# Patient Record
Sex: FEMALE | Race: OTHER
[De-identification: names, ages, dates, MRNs, and addresses within clinical notes are randomized per-mention and may not be internally consistent; named-entity substitution may affect disease eponyms.]

---

## 2019-08-23 ENCOUNTER — HOSPITAL ENCOUNTER (INPATIENT)
Dept: HOSPITAL 74 - JLDR | Age: 39
LOS: 3 days | Discharge: HOME | End: 2019-08-26
Attending: OBSTETRICS & GYNECOLOGY | Admitting: OBSTETRICS & GYNECOLOGY
Payer: COMMERCIAL

## 2019-08-23 VITALS — BODY MASS INDEX: 26.3 KG/M2

## 2019-08-23 DIAGNOSIS — O34.03: ICD-10-CM

## 2019-08-23 DIAGNOSIS — Q51.3: ICD-10-CM

## 2019-08-23 DIAGNOSIS — Z3A.39: ICD-10-CM

## 2019-08-23 DIAGNOSIS — O32.2XX0: Primary | ICD-10-CM

## 2019-08-23 RX ADMIN — Medication SCH MLS/HR: at 13:44

## 2019-08-23 RX ADMIN — IBUPROFEN PRN MG: 800 INJECTION INTRAVENOUS at 21:45

## 2019-08-23 NOTE — HP
Past Medical History





- Admission


Chief Complaint: transverse lie for c s


History Source: Patient


Limitations to Obtaining History: No Limitations





- Past Medical History


CNS: No: Alzheimer's, CVA, Dementia, Migraine, Multiple Sclerosis, Peripheral 

Neuropathy, Parkinson's, Seizure, Syncope, TIA, Vertigo, Other


Cardiovascular: No: AFIB, Aneurysm, Aortic Insufficiency, Aortic Stenosis, CAD, 

CHF, Deep Vein Thrombosis, HTN, Hyperlipdemia, MI, Mitral Insufficiency, Mitral 

Stenosis, Murmur, Pulmonary Hypertension, Other


Pulmonary: No: Asthma, Bronchitis, Cancer, COPD, O2 Dependent, Pneumonia, 

Previously Intubated, Pulmonary Embolus, Pulmonary Fibrosis, Sleep Apnea, Other


Gastrointestinal: No: Ascites, Cancer, Constipation, Crohn's Disease, 

Diverticulitis, Diverticulosis, Esophageal Varices, Gastritis, GERD, GI Bleed, 

Hemorrhoids, Hiatal Hernia, Inflamatory Bowel Disease, Irritable Bowel Disease, 

Pancreatitis, Peptic Ulcer Disease, Ulcerative Colitis, Other


Hepatobiliary: No: Cirrhosis, Cholelithiasis, Cholecystitis, Choledocholithiasis

, Hepatitis A, Hepatitis B, Hepatitis C, Other


Renal/: No: Renal Failure, Renal Inusuff, BPH, Cancer, Hematuria, Hemodialysis

, Neurogenic Bladder, Renal Calculi, UTI, Other


Reproductive: No: Ectopic Pregnancy, Endometriosis, Fibroids, PID, Polycystic 

Ovary Syndrome, Postmenopausal, Other


...: 1


...Para: 0


...Term: 0


...: 0


...Spon : 0


...Induced : 0


...Multiple Gestation: 0


...LMP: 18


... Weeks Gestation by Dates: 39.0


...EDC by Dates: 19


...EDC by Sono: 19


Heme/Onc: No: Anemia, B12 Deficiency, Bleeding Disorder, Cancer, Current 

Chemotherapy, Current Radiation Therapy, Hemochromatosis, Hypercoaguable State, 

Myeloproliferative Synd, Sickle Cell Disease, Sickle Cell Trait, 

Thrombocytopenia, Other


Infectious Disease: No: AIDS, C-Diff, Herpes Zoster, HIV, MRSA, STD's, 

Tuberculosis, VREF, Other


Psych: No: Addictions, Anxiety, Bipolar, Depression, Panic, Psychosis, 

Schizophrenia, Other


Musculoskeletal: No: Bursitis, Chronic low back pain, Hemiparesis, Hemiplegia, 

Osteoarthritis, Paraplegia, Other


Rheumatology: No: Fibromyalgia, Gout, Lupus, Rheumatoid Arthritis, Sarcoidosis, 

Vasculitis, Other


ENT: No: Allergic Rhinitis, Sinusitis, Other


Endocrine: No: [place]'s Disease, Cushing's Disease, Diabetes Insipidus, 

Diabetes Mellitus, Hyperparathyroidism, Hyperthyroidism, Hypothyroidism, 

Osteopenia, SIADH, Other


Dermatology: No: Basal Cell, Cellulitis, Eczema, Melanoma, Psoriasis, Squamous 

Cell, Other





- Past Surgical History


Past Surgical History: No: None, AAA Repair, AICD, Amputation, Appendectomy, 

Arthrosocopy, AV Fistula/Graft, Bariatric Surgery, Breast Biopsy, Bypass, CABG, 

Carotid Endarterectomy, Cataract Removal, Cholecystectomy, Colectomy, 

Colonoscopy, Colostomy, Craniotomy, , Cystectomy, Hernia Repair, 

Hysterectomy, Ileal Conduit, Ileosotomy, Joint Replacement, Kidney Transplant, 

Laminectomy, Liver Transplant, Mastectomy, Nephrectomy, Oopherectomy, 

Orchiectomy, Permanent Pacemaker, Prostatectomy, Splenectomy, Stent, Thoracotomy

, TURP, Tonsillectomy, Tubal Ligation, Upper Endoscopy, Valve Replacement, 

Vasectomy, Vein Stripping/Ligation


Hx Myomectomy: No


Hx Transabdominal Cerclage: No





- Advance Directives


Advance Directives: Yes: Living Will





- Smoking History


Smoking history: Never smoked


Have you smoked in the past 12 months: No





- Alcohol/Substance Use


Hx Alcohol Use: No


History of Substance Use: reports: None





- Social History


Usual Living Arrangement: Yes: With Spouse


ADL: Independent


History of Recent Travel: No





Home Medications





- Allergies


Allergies/Adverse Reactions: 


 Allergies











Allergy/AdvReac Type Severity Reaction Status Date / Time


 


No Known Allergies Allergy   Verified 19 11:38














- Home Medications


Home Medications: 


Ambulatory Orders





Prenatal Vitamins (Sjr) - 1 tab PO DAILY 19 











Family Disease History





- Family Disease History


Family History: Denies





Review of Systems





- Review of Systems


Constitutional: reports: No Symptoms


Eyes: reports: No Symptoms


HENT: reports: No Symptoms


Neck: reports: No Symptoms


Cardiovascular: reports: No Symptoms


Respiratory: reports: No Symptoms


Gastrointestinal: reports: No Symptoms


Genitourinary: reports: No Symptoms


Breasts: reports: No Symptoms Reported


Musculoskeletal: reports: No Symptoms


Integumentary: reports: No Symptoms


Neurological: reports: No Symptoms


Endocrine: reports: No Symptoms


Hematology/Lymphatic: reports: No Symptoms


Psychiatric: reports: No Symptoms


Pain Intensity: 0





Physical Exam - Maternity


Vital Signs: 


 Vital Signs











Temperature  97.5 F L  19 15:50


 


Pulse Rate  69   19 15:50


 


Respiratory Rate  15   19 15:50


 


Blood Pressure  94/61   19 15:50


 


O2 Sat by Pulse Oximetry (%)  100   19 15:50











Constitutional: Yes: Well Nourished, No Distress, Calm


Eyes: Yes: WNL, Conjunctiva Clear, EOM Intact


HENT: Yes: WNL, Atraumatic, Normocephalic


Neck: Yes: WNL, Supple, Trachea Midline


Cardiovascular: Yes: WNL, Regular Rate and Rhythm


Lungs: Clear to auscultation


Breast(s): Yes: WNL





- Abdominal Exam/OB


Fundal Height: 38


Number of Fetuses: Single


Fetal Presentation: Transverse


Contractions: Yes


Regularity: Irritability


Intensity: Unaware


Fetal Monitor Mode: External


Fetal Heart Rate Location: [facility] [place]


Category: I


Accelerations: Uniform


Decelerations: None





- Vaginal Exam/OB


Vaginal Bleediing: No


Speculum Exam: No


Dilatation (cm): 1


Effacement (%): 20 


Amniotic Membrane Status: Intact


Fetal Presentation: Transverse/Shoulder


Fetal Station: -3





- Physical Exam


Musculoskeletal: Yes: WNL


Extremities: Yes: WNL


Edema: No


Edema: LUE: 1+, RUE: 1+, LLE: 1+, RLE: 1+


Integumentary: Yes: WNL


Deep Tendon Reflex Grade: Normal +2


...Motor Strength: WNL


Psychiatric: Yes: WNL, Alert, Oriented





Hemorrhage Risk Assessment





- Risk Factors


Risk Score: 0


Risk Level: Low Risk





Assessment/Plan





transverse lie for c s

## 2019-08-23 NOTE — OP
DATE OF OPERATION:  2019

 

PREOPERATIVE DIAGNOSIS:  A transverse lie.  

 

POSTOPERATIVE DIAGNOSIS:  A transverse lie.  Bicornuate uterus.  

 

PROCEDURE:  Primary low transverse  section.  

 

SURGEON:  Hardik Dasilva M.D. 

 

ASSISTANT:  CYNDI Craig

 

ANESTHESIA:  Spinal

 

ANESTHESIOLOGIST:  Benny Harris M.D. 

 

INDICATION:  This is a 38-year-old female patient, 39 weeks pregnant.  Baby has been

stabilized from breech to vertex to transverse to breech to transverse again to

today, a transverse lie, 39 weeks pregnant.  Patient has been with sonogram in the

past 6-7 weeks, and the patient is explained the situation, and the patient agreed

for primary low transverse  section.  _____ request  too.

 

DESCRIPTION OF PROCEDURE:  Patient was placed to OR and placed on operating table in

supine position after spinal anesthesia was obtained.  The patient's abdomen and

pelvis were prepped and draped in the usual sterile manner.  Pfannenstiel incision

was made.  The incision was made through skin, subcutaneous tissue, until the fascia

was nicked in the midline.  The fascia was extended bilaterally.  Intraperitoneal

cavity was entered, bladder flap was created.  Low transverse section of uterus was

entered, baby delivered from transverse presentation to vertex presentation.  Patient

was removed in the vertex presentation.  Baby was handed over to the pediatrician

after umbilical cord was doubly clamped and cut.  Cord blood gas was obtained. 

Placenta was removed.  Uterus was closed in single layer, first layer interlocking

Vicryl suture, good hemostasis, and both gutters clean.  Both ovaries, fallopian

tubes were within normal limits.  However, uterus appeared to be bicornuate uterus. 

No septum in the uterus.  Appeared to be thickened in the middle.  So this could be

the cause of the transverse lie of the baby, so uterus was closed in single layer. 

Good hemostasis.  The both gutters were clean, and draining clear urine _____ 100 mL.

 Peritoneum was closed.  Fascia was closed.  Skin was closed.  Transferred to

recovery room in stable condition.

 

 

MD ZARA [id number]

DD: 2019 17:35

DT: 2019 20:02

Job #:  [id number]

## 2019-08-23 NOTE — OP
Operative Note





- Note:


Operative Date: 08/23/19


Pre-Operative Diagnosis: transverse lie


Operation: primary lt c s


Findings: 





bicornuated uterus 


Post-Operative Diagnosis: Same as Pre-op


Surgeon: Hardik Dasilva


Assistant: Miles Tracey


Anesthesia: Spinal


Estimated Blood Loss (mls): 700


Operative Report Dictated: Yes

## 2019-08-24 LAB
BASOPHILS # BLD: 0.5 % (ref 0–2)
DEPRECATED RDW RBC AUTO: 13.2 % (ref 11.6–15.6)
EOSINOPHIL # BLD: 3 % (ref 0–4.5)
HCT VFR BLD CALC: 31.7 % (ref 32.4–45.2)
HGB BLD-MCNC: 10.7 GM/DL (ref 10.7–15.3)
LYMPHOCYTES # BLD: 22.8 % (ref 8–40)
MCH RBC QN AUTO: 30.2 PG (ref 25.7–33.7)
MCHC RBC AUTO-ENTMCNC: 33.8 G/DL (ref 32–36)
MCV RBC: 89.3 FL (ref 80–96)
MONOCYTES # BLD AUTO: 5.6 % (ref 3.8–10.2)
NEUTROPHILS # BLD: 68.1 % (ref 42.8–82.8)
PLATELET # BLD AUTO: 118 K/MM3 (ref 134–434)
PMV BLD: 11.3 FL (ref 7.5–11.1)
RBC # BLD AUTO: 3.55 M/MM3 (ref 3.6–5.2)
WBC # BLD AUTO: 12.1 K/MM3 (ref 4–10)

## 2019-08-24 RX ADMIN — IBUPROFEN PRN MG: 800 INJECTION INTRAVENOUS at 05:12

## 2019-08-24 RX ADMIN — IBUPROFEN PRN MG: 600 TABLET, FILM COATED ORAL at 13:52

## 2019-08-24 RX ADMIN — SIMETHICONE CHEW TAB 80 MG PRN MG: 80 TABLET ORAL at 21:48

## 2019-08-24 RX ADMIN — SIMETHICONE CHEW TAB 80 MG PRN MG: 80 TABLET ORAL at 13:52

## 2019-08-24 RX ADMIN — IBUPROFEN PRN MG: 600 TABLET, FILM COATED ORAL at 21:48

## 2019-08-24 NOTE — PN
Progress Note (short form)





- Note


Progress Note: 





Anesthesiology Post-op


38 y.o. woman POD#1 s/p C/S under spinal.


Pt. is doing well. Pain under control. No h/a. Spinal resolved. VSS.


38 y.o. woman with stable post-operative course.


Continue management as per primary team.

## 2019-08-25 VITALS — HEART RATE: 65 BPM

## 2019-08-25 LAB
ANION GAP SERPL CALC-SCNC: 5 MMOL/L (ref 8–16)
BUN SERPL-MCNC: 6.6 MG/DL (ref 7–18)
CALCIUM SERPL-MCNC: 8.3 MG/DL (ref 8.5–10.1)
CHLORIDE SERPL-SCNC: 109 MMOL/L (ref 98–107)
CO2 SERPL-SCNC: 29 MMOL/L (ref 21–32)
CREAT SERPL-MCNC: 0.5 MG/DL (ref 0.55–1.3)
GLUCOSE SERPL-MCNC: 69 MG/DL (ref 74–106)
POTASSIUM SERPLBLD-SCNC: 3.8 MMOL/L (ref 3.5–5.1)
SODIUM SERPL-SCNC: 142 MMOL/L (ref 136–145)

## 2019-08-25 RX ADMIN — IBUPROFEN PRN MG: 600 TABLET, FILM COATED ORAL at 22:17

## 2019-08-25 RX ADMIN — IBUPROFEN PRN MG: 600 TABLET, FILM COATED ORAL at 08:01

## 2019-08-25 RX ADMIN — ACETAMINOPHEN PRN MG: 325 TABLET ORAL at 15:02

## 2019-08-25 RX ADMIN — ACETAMINOPHEN PRN MG: 325 TABLET ORAL at 22:16

## 2019-08-25 RX ADMIN — SIMETHICONE CHEW TAB 80 MG PRN MG: 80 TABLET ORAL at 08:01

## 2019-08-25 RX ADMIN — IBUPROFEN PRN MG: 600 TABLET, FILM COATED ORAL at 15:03

## 2019-08-25 RX ADMIN — ENOXAPARIN SODIUM SCH: 40 INJECTION SUBCUTANEOUS at 12:41

## 2019-08-25 RX ADMIN — Medication SCH: at 12:40

## 2019-08-25 RX ADMIN — SIMETHICONE CHEW TAB 80 MG PRN MG: 80 TABLET ORAL at 15:02

## 2019-08-25 RX ADMIN — SIMETHICONE CHEW TAB 80 MG PRN MG: 80 TABLET ORAL at 22:17

## 2019-08-25 RX ADMIN — ENOXAPARIN SODIUM SCH: 40 INJECTION SUBCUTANEOUS at 12:01

## 2019-08-25 NOTE — PN
Post Partum Progress Note


Type of Delivery: Primary C/S


Vital Signs: 


 Vital Signs











Temperature  98.1 F   08/25/19 20:22


 


Pulse Rate  65   08/25/19 20:22


 


Respiratory Rate  20   08/25/19 20:22


 


Blood Pressure  120/75   08/25/19 20:22


 


O2 Sat by Pulse Oximetry (%)  100   08/23/19 15:50











Breast Exam: Yes: Soft


Uterus: Yes: Fundus Firm, Fundus below umbilicus


Incision: Yes: Dressing dry and intact, Sutures intact


Abdomen/GI: Yes: Abdomen soft, Passing flatus, Tolerating PO


Lochia: Yes: Serosa


Lochia, amount: Small


Extremities: Yes: Calves non-tender


Activity: Ambulating





- Labs


Labs: 


 CBC











WBC  12.1 K/mm3 (4.0-10.0)  H  08/24/19  07:05    


 


RBC  3.55 M/mm3 (3.60-5.2)  L  08/24/19  07:05    


 


Hgb  10.7 GM/dL (10.7-15.3)   08/24/19  07:05    


 


Hct  31.7 % (32.4-45.2)  L  08/24/19  07:05    


 


MCV  89.3 fl (80-96)   08/24/19  07:05    


 


MCH  30.2 pg (25.7-33.7)   08/24/19  07:05    


 


MCHC  33.8 g/dl (32.0-36.0)   08/24/19  07:05    


 


RDW  13.2 % (11.6-15.6)   08/24/19  07:05    


 


Plt Count  118 K/MM3 (134-434)  L  08/24/19  07:05    


 


MPV  11.3 fl (7.5-11.1)  H  08/24/19  07:05    


 


Absolute Neuts (auto)  8.2 K/mm3 (1.5-8.0)  H  08/24/19  07:05    


 


Neutrophils %  68.1 % (42.8-82.8)   08/24/19  07:05    


 


Lymphocytes %  22.8 % (8-40)   08/24/19  07:05    


 


Monocytes %  5.6 % (3.8-10.2)   08/24/19  07:05    


 


Eosinophils %  3.0 % (0-4.5)   08/24/19  07:05    


 


Basophils %  0.5 % (0-2.0)   08/24/19  07:05    


 


Nucleated RBC %  0 % (0-0)   08/24/19  07:05    














Assessment/Plan





dcpt home tomorrow

## 2019-08-25 NOTE — DS
Physical Exam-GYN


Vital Signs: 


 Vital Signs











Temperature  98.1 F   19 20:22


 


Pulse Rate  65   19 20:22


 


Respiratory Rate  20   19 20:22


 


Blood Pressure  120/75   19 20:22


 


O2 Sat by Pulse Oximetry (%)  100   19 15:50











Constitutional: Yes: Well Nourished, No Distress, Calm


Eyes: Yes: WNL, Conjunctiva Clear, EOM Intact


HENT: Yes: WNL, Atraumatic, Normocephalic


Neck: Yes: WNL, Supple, Trachea Midline


Cardiovascular: Yes: WNL, Regular Rate and Rhythm


Respiratory: Yes: WNL, Regular, CTA Bilaterally


Gastrointestinal: Yes: WNL, Normal Bowel Sounds, Soft


...Rectal Exam: Yes: WNL


Renal/: Yes: WNL


Pelvis: Yes: WNL


External Genitalia: Yes: Normal


Internal Exam Deferred: No


Vaginal Exam: Yes: Normal


Cervix: Yes: Normal


Uterus: Yes: Normal


Adnexa: Normal: Bilateral


....Post Partum: Yes: Uterus non-tender


Breast(s): Yes: WNL


Musculoskeletal: Yes: WNL


Extremities: Yes: WNL


Edema: Yes


Edema: LUE: 1+, RUE: 1+, LLE: 1+, RLE: 1+


Integumentary: Yes: WNL


Wound/Incision: Yes: Clean/Dry, Well Approximated


Neurological: Yes: WNL, Alert, Oriented


...Motor Strength: WNL


Psychiatric: Yes: WNL, Alert, Oriented


Labs: 


 CBC, BMP





 19 07:05 





 19 07:26 











Delivery





- Delivery


Type of Anesthesia: Spinal


EBL (cc): 700





Delivery, Single Birth





- Stages of Labor


Date of Delivery: 19


Time of Delivery: 13:43


Time Placenta Delivered: 13:44





- Condition of Infant


Pediatrician/Neonatologist Present: No


Infant Gender: Male


Birth Weight: 2.863 kg


Total Hours ROM (Hrs/Mins): 0Hrs/2Mins





- Apgar


  ** 1 Minute


Apgar Total Score: 9





  ** 5 Minutes


Apgar Total Score: 9





-  Feeding Plan


Initial Plan: Exclusive breastfeeding throughout hospitalization





Discharge Summary


Reason For Visit: 


Condition: Stable





- Instructions


Diet, Activity, Other Instructions: 


regular 





- Home Medications


Comprehensive Discharge Medication List: 


Ambulatory Orders





Prenatal Vitamins (Sjr) - 1 tab PO DAILY 19

## 2019-08-26 VITALS — SYSTOLIC BLOOD PRESSURE: 110 MMHG | TEMPERATURE: 97.8 F | DIASTOLIC BLOOD PRESSURE: 79 MMHG

## 2019-08-26 LAB
BASOPHILS # BLD: 0.6 % (ref 0–2)
DEPRECATED RDW RBC AUTO: 13.3 % (ref 11.6–15.6)
EOSINOPHIL # BLD: 8.4 % (ref 0–4.5)
HCT VFR BLD CALC: 30.4 % (ref 32.4–45.2)
HGB BLD-MCNC: 10.5 GM/DL (ref 10.7–15.3)
LYMPHOCYTES # BLD: 35.8 % (ref 8–40)
MCH RBC QN AUTO: 30.7 PG (ref 25.7–33.7)
MCHC RBC AUTO-ENTMCNC: 34.5 G/DL (ref 32–36)
MCV RBC: 89 FL (ref 80–96)
MONOCYTES # BLD AUTO: 6.8 % (ref 3.8–10.2)
NEUTROPHILS # BLD: 48.4 % (ref 42.8–82.8)
PLATELET # BLD AUTO: 151 K/MM3 (ref 134–434)
PMV BLD: 10.7 FL (ref 7.5–11.1)
RBC # BLD AUTO: 3.42 M/MM3 (ref 3.6–5.2)
WBC # BLD AUTO: 8.4 K/MM3 (ref 4–10)

## 2019-08-26 RX ADMIN — ACETAMINOPHEN PRN MG: 325 TABLET ORAL at 10:22

## 2019-08-26 RX ADMIN — SIMETHICONE CHEW TAB 80 MG PRN MG: 80 TABLET ORAL at 10:23

## 2019-09-05 NOTE — PATH
Surgical Pathology Report



Patient Name:  MICHELLE BONDS

Accession #:  [id number]

[facility]. Rec. #:  [id number]                                                        

   /Age/Gender:  1980 (Age: 38) / F

Account:  [id number]                                                          

             Location: [facility] OBS/GYN

Taken:  2019

Received:  2019

Reported:  2019

Physicians:  Hardik Dasilva MD

  



Specimen(s) Received

 PLACENTA 





Clinical History

 39 weeks primary  for transverse presentation







Final Diagnosis

PLACENTA, :

MATURE THIRD TRIMESTER PLACENTA (354 G) SHOWING ACUTE CHORIOAMNIONITIS AND

TRIVESSEL UMBILICAL CORD.





***Electronically Signed***

Melisa Torres M.D.





Gross Description

The specimen is received fresh labeled placenta and is a 354 gram, 19 x16 x

1.5cm. placenta with attached membranes and umbilical cord.  The attached

membranes appear tan and dull and insert marginally.  The umbilical cord

measures 33 cm. in length and averages 1.1 cm. in diameter.  The cord inserts

centrally, 5 centimeter to the nearest margin. No true knots or strictures are

identified. Cut surface of the umbilical cord reveals 3 vessels. Sectioning

reveals red-brown, spongy parenchyma.  No lesions are identified. 

Representative sections are submitted in three cassettes as follows: 1- membrane

rolls and umbilical cord; 2-3- full thickness sections of placenta

KWS/[email or web address]



[email or web address]

## 2023-02-17 ENCOUNTER — HOSPITAL ENCOUNTER (INPATIENT)
Dept: HOSPITAL 74 - JDEL | Age: 43
LOS: 2 days | Discharge: HOME | DRG: 833 | End: 2023-02-19
Attending: OBSTETRICS & GYNECOLOGY | Admitting: OBSTETRICS & GYNECOLOGY
Payer: COMMERCIAL

## 2023-02-17 VITALS — BODY MASS INDEX: 24.2 KG/M2

## 2023-02-17 DIAGNOSIS — O34.211: Primary | ICD-10-CM

## 2023-02-17 DIAGNOSIS — Z3A.39: ICD-10-CM

## 2023-02-17 LAB
BASOPHILS # BLD: 0.2 % (ref 0–2)
DEPRECATED RDW RBC AUTO: 12.6 % (ref 11.6–15.6)
EOSINOPHIL # BLD: 0.4 % (ref 0–4.5)
HCT VFR BLD CALC: 33.3 % (ref 32.4–45.2)
HGB BLD-MCNC: 11.4 GM/DL (ref 10.7–15.3)
LYMPHOCYTES # BLD: 11.1 % (ref 8–40)
MCH RBC QN AUTO: 29.9 PG (ref 25.7–33.7)
MCHC RBC AUTO-ENTMCNC: 34.2 G/DL (ref 32–36)
MCV RBC: 87.3 FL (ref 80–96)
MONOCYTES # BLD AUTO: 3.7 % (ref 3.8–10.2)
NEUTROPHILS # BLD: 84.6 % (ref 42.8–82.8)
PLATELET # BLD AUTO: 134 10^3/UL (ref 134–434)
PMV BLD: 11.5 FL (ref 7.5–11.1)
RBC # BLD AUTO: 3.81 M/MM3 (ref 3.6–5.2)
WBC # BLD AUTO: 13.6 K/MM3 (ref 4–10)

## 2023-02-17 RX ADMIN — ACETAMINOPHEN PRN MG: 325 TABLET ORAL at 21:04

## 2023-02-17 RX ADMIN — IBUPROFEN PRN MG: 600 TABLET, FILM COATED ORAL at 19:00

## 2023-02-17 RX ADMIN — FERROUS SULFATE TAB EC 324 MG (65 MG FE EQUIVALENT) SCH: 324 (65 FE) TABLET DELAYED RESPONSE at 15:00

## 2023-02-17 RX ADMIN — FERROUS SULFATE TAB EC 324 MG (65 MG FE EQUIVALENT) SCH MG: 324 (65 FE) TABLET DELAYED RESPONSE at 21:04

## 2023-02-17 RX ADMIN — Medication SCH TAB: at 15:03

## 2023-02-17 RX ADMIN — SIMETHICONE CHEW TAB 80 MG PRN MG: 80 TABLET ORAL at 15:03

## 2023-02-17 RX ADMIN — SIMETHICONE CHEW TAB 80 MG PRN MG: 80 TABLET ORAL at 19:01

## 2023-02-18 VITALS — HEART RATE: 66 BPM

## 2023-02-18 RX ADMIN — IBUPROFEN PRN MG: 600 TABLET, FILM COATED ORAL at 06:28

## 2023-02-18 RX ADMIN — SIMETHICONE CHEW TAB 80 MG PRN MG: 80 TABLET ORAL at 10:46

## 2023-02-18 RX ADMIN — IBUPROFEN PRN MG: 600 TABLET, FILM COATED ORAL at 22:13

## 2023-02-18 RX ADMIN — SIMETHICONE CHEW TAB 80 MG PRN MG: 80 TABLET ORAL at 21:23

## 2023-02-18 RX ADMIN — FERROUS SULFATE TAB EC 324 MG (65 MG FE EQUIVALENT) SCH MG: 324 (65 FE) TABLET DELAYED RESPONSE at 10:46

## 2023-02-18 RX ADMIN — SIMETHICONE CHEW TAB 80 MG PRN MG: 80 TABLET ORAL at 06:29

## 2023-02-18 RX ADMIN — IBUPROFEN PRN MG: 600 TABLET, FILM COATED ORAL at 17:10

## 2023-02-18 RX ADMIN — FERROUS SULFATE TAB EC 324 MG (65 MG FE EQUIVALENT) SCH MG: 324 (65 FE) TABLET DELAYED RESPONSE at 21:21

## 2023-02-18 RX ADMIN — Medication SCH TAB: at 10:46

## 2023-02-18 RX ADMIN — IBUPROFEN PRN MG: 600 TABLET, FILM COATED ORAL at 10:46

## 2023-02-19 VITALS — TEMPERATURE: 98.2 F | SYSTOLIC BLOOD PRESSURE: 111 MMHG | RESPIRATION RATE: 18 BRPM | DIASTOLIC BLOOD PRESSURE: 75 MMHG

## 2023-02-19 RX ADMIN — Medication SCH TAB: at 09:29

## 2023-02-19 RX ADMIN — ACETAMINOPHEN PRN MG: 325 TABLET ORAL at 09:29

## 2023-02-19 RX ADMIN — FERROUS SULFATE TAB EC 324 MG (65 MG FE EQUIVALENT) SCH MG: 324 (65 FE) TABLET DELAYED RESPONSE at 09:29

## 2023-02-19 RX ADMIN — IBUPROFEN PRN MG: 600 TABLET, FILM COATED ORAL at 06:28
